# Patient Record
Sex: FEMALE | Race: OTHER | URBAN - METROPOLITAN AREA
[De-identification: names, ages, dates, MRNs, and addresses within clinical notes are randomized per-mention and may not be internally consistent; named-entity substitution may affect disease eponyms.]

---

## 2023-06-20 ENCOUNTER — TELEMEDICINE (OUTPATIENT)
Facility: CLINIC | Age: 33
End: 2023-06-20
Payer: COMMERCIAL

## 2023-06-20 DIAGNOSIS — B37.2 SKIN CANDIDIASIS: Primary | ICD-10-CM

## 2023-06-20 PROBLEM — E03.9 HYPOTHYROIDISM: Status: ACTIVE | Noted: 2023-06-20

## 2023-06-20 PROBLEM — C50.919 BREAST CANCER (HCC): Status: ACTIVE | Noted: 2023-06-20

## 2023-06-20 PROCEDURE — 99203 OFFICE O/P NEW LOW 30 MIN: CPT | Performed by: FAMILY MEDICINE

## 2023-06-20 RX ORDER — TAMOXIFEN CITRATE 20 MG/1
20 TABLET ORAL DAILY
COMMUNITY
Start: 2019-09-23

## 2023-06-20 RX ORDER — CLOTRIMAZOLE AND BETAMETHASONE DIPROPIONATE 10; .64 MG/G; MG/G
CREAM TOPICAL
Qty: 15 G | Refills: 0 | Status: SHIPPED | OUTPATIENT
Start: 2023-06-20

## 2023-06-20 RX ORDER — CHLORAL HYDRATE 500 MG
1000 CAPSULE ORAL DAILY
COMMUNITY

## 2023-06-20 RX ORDER — LEVOTHYROXINE SODIUM 137 UG/1
TABLET ORAL
COMMUNITY
Start: 2022-10-06

## 2023-06-20 SDOH — HEALTH STABILITY: PHYSICAL HEALTH: ON AVERAGE, HOW MANY MINUTES DO YOU ENGAGE IN EXERCISE AT THIS LEVEL?: 20 MIN

## 2023-06-20 SDOH — HEALTH STABILITY: PHYSICAL HEALTH: ON AVERAGE, HOW MANY DAYS PER WEEK DO YOU ENGAGE IN MODERATE TO STRENUOUS EXERCISE (LIKE A BRISK WALK)?: 1 DAY

## 2023-06-20 ASSESSMENT — PATIENT HEALTH QUESTIONNAIRE - PHQ9
SUM OF ALL RESPONSES TO PHQ QUESTIONS 1-9: 0
2. FEELING DOWN, DEPRESSED OR HOPELESS: 0
SUM OF ALL RESPONSES TO PHQ QUESTIONS 1-9: 0
1. LITTLE INTEREST OR PLEASURE IN DOING THINGS: 0
SUM OF ALL RESPONSES TO PHQ9 QUESTIONS 1 & 2: 0

## 2023-06-20 NOTE — PROGRESS NOTES
Chief Complaint   Patient presents with    Establish Care     1. Have you been to the ER, urgent care clinic since your last visit? Hospitalized since your last visit? No    2. Have you seen or consulted any other health care providers outside of the [address] [place] [address] since your last visit? Include any pap smears or colon screening.  No

## 2023-06-20 NOTE — PROGRESS NOTES
Subjective:      Patient ID: Kerwin Rodriguez is a 28 y.o. female. Patient presents with:  Establish Care    She is one year post partum and has had an intermittent rash on both wrists ever since. It has now migrated to the beneath her wedding ring for the past 2 months and is getting worse. She has tried and OTC antifungal with mixed results. Soaking it in vinegar helps. Review of Systems   Skin:  Positive for rash. The rash itches sometimes but is not painful. Objective:   Physical Exam  Constitutional:       General: She is not in acute distress. Appearance: Normal appearance. Skin:     Comments: Hyperkeratotic, pink, rash in a wedding band distribution. Neurological:      Mental Status: She is alert and oriented to person, place, and time. Assessment:       Diagnosis Orders   1. Skin candidiasis  clotrimazole-betamethasone (LOTRISONE) 1-0.05 % cream              Plan:          Keep area dry  Lotrisone cream    Return if symptoms worsen or fail to improve. Reviewed plan of care. Patient has provided input and agrees with goals.             Carlitos Conde MD